# Patient Record
Sex: FEMALE | Race: WHITE
[De-identification: names, ages, dates, MRNs, and addresses within clinical notes are randomized per-mention and may not be internally consistent; named-entity substitution may affect disease eponyms.]

---

## 2018-07-15 ENCOUNTER — HOSPITAL ENCOUNTER (EMERGENCY)
Dept: HOSPITAL 61 - ER | Age: 48
Discharge: HOME | End: 2018-07-15
Payer: SELF-PAY

## 2018-07-15 DIAGNOSIS — R06.02: ICD-10-CM

## 2018-07-15 DIAGNOSIS — R07.89: Primary | ICD-10-CM

## 2018-07-15 DIAGNOSIS — Z90.710: ICD-10-CM

## 2018-07-15 DIAGNOSIS — Z88.5: ICD-10-CM

## 2018-07-15 DIAGNOSIS — F32.9: ICD-10-CM

## 2018-07-15 DIAGNOSIS — Z88.8: ICD-10-CM

## 2018-07-15 DIAGNOSIS — F41.9: ICD-10-CM

## 2018-07-15 DIAGNOSIS — M54.2: ICD-10-CM

## 2018-07-15 LAB
ADD MAN DIFF?: NO
ALBUMIN SERPL-MCNC: 3.9 G/DL (ref 3.4–5)
ALBUMIN/GLOB SERPL: 1 {RATIO} (ref 1–1.7)
ALP SERPL-CCNC: 40 U/L (ref 46–116)
ALT (SGPT): 16 U/L (ref 14–59)
ANION GAP SERPL CALC-SCNC: 11 MMOL/L (ref 6–14)
AST SERPL-CCNC: 16 U/L (ref 15–37)
BASO #: 0.1 X10^3/UL (ref 0–0.2)
BASO %: 1 % (ref 0–3)
BLOOD UREA NITROGEN: 15 MG/DL (ref 7–20)
BUN/CREAT SERPL: 19 (ref 6–20)
CALCIUM: 8.9 MG/DL (ref 8.5–10.1)
CHLORIDE: 103 MMOL/L (ref 98–107)
CK SERPL-CCNC: 84 U/L (ref 26–192)
CKMB INDEX: 1.3 % (ref 0–4)
CKMB MASS: 1.1 NG/ML (ref 0–3.6)
CO2 SERPL-SCNC: 25 MMOL/L (ref 21–32)
CREAT SERPL-MCNC: 0.8 MG/DL (ref 0.6–1)
D-DIMER: < 0.27 UG/MLFEU (ref 0–0.5)
EOS #: 0.2 X10^3/UL (ref 0–0.7)
EOS %: 2 % (ref 0–3)
GFR SERPLBLD BASED ON 1.73 SQ M-ARVRAT: 76.2 ML/MIN
GLOBULIN SER-MCNC: 4 G/DL (ref 2.2–3.8)
GLUCOSE SERPL-MCNC: 135 MG/DL (ref 70–99)
HCG SERPL-ACNC: 9.2 X10^3/UL (ref 4–11)
HEMATOCRIT: 41.7 % (ref 36–47)
HEMOGLOBIN: 13.9 G/DL (ref 12–15.5)
INR: 1 (ref 0.8–1.1)
LYMPH #: 3.5 X10^3/UL (ref 1–4.8)
LYMPH %: 38 % (ref 24–48)
MEAN CORPUSCULAR HEMOGLOBIN: 33 PG (ref 25–35)
MEAN CORPUSCULAR HGB CONC: 33 G/DL (ref 31–37)
MEAN CORPUSCULAR VOLUME: 98 FL (ref 79–100)
MONO #: 0.7 X10^3/UL (ref 0–1.1)
MONO %: 7 % (ref 0–9)
NEUT #: 4.7 X10^3UL (ref 1.8–7.7)
NEUT %: 51 % (ref 31–73)
NT-PRO BNP: 72 PG/ML (ref 0–124)
PLATELET COUNT: 431 X10^3/UL (ref 140–400)
POTASSIUM SERPL-SCNC: 3.7 MMOL/L (ref 3.5–5.1)
PROTHROMBIN TIME PATIENT: 13 SEC (ref 11.7–14)
RED BLOOD COUNT: 4.24 X10^6/UL (ref 3.5–5.4)
RED CELL DISTRIBUTION WIDTH: 13.7 % (ref 11.5–14.5)
SODIUM: 139 MMOL/L (ref 136–145)
THYROID STIM HORMONE (TSH): 1.75 UIU/ML (ref 0.36–3.74)
TOTAL BILIRUBIN: 0.2 MG/DL (ref 0.2–1)
TOTAL PROTEIN: 7.9 G/DL (ref 6.4–8.2)
TROPONINI: < 0.017 NG/ML (ref 0–0.06)
TROPONINI: < 0.017 NG/ML (ref 0–0.06)

## 2018-07-15 PROCEDURE — 84443 ASSAY THYROID STIM HORMONE: CPT

## 2018-07-15 PROCEDURE — 85379 FIBRIN DEGRADATION QUANT: CPT

## 2018-07-15 PROCEDURE — 83880 ASSAY OF NATRIURETIC PEPTIDE: CPT

## 2018-07-15 PROCEDURE — 82553 CREATINE MB FRACTION: CPT

## 2018-07-15 PROCEDURE — 80053 COMPREHEN METABOLIC PANEL: CPT

## 2018-07-15 PROCEDURE — 85025 COMPLETE CBC W/AUTO DIFF WBC: CPT

## 2018-07-15 PROCEDURE — 99285 EMERGENCY DEPT VISIT HI MDM: CPT

## 2018-07-15 PROCEDURE — 36415 COLL VENOUS BLD VENIPUNCTURE: CPT

## 2018-07-15 PROCEDURE — 85610 PROTHROMBIN TIME: CPT

## 2018-07-15 PROCEDURE — 71046 X-RAY EXAM CHEST 2 VIEWS: CPT

## 2018-07-15 PROCEDURE — 93005 ELECTROCARDIOGRAM TRACING: CPT

## 2018-07-15 PROCEDURE — 84484 ASSAY OF TROPONIN QUANT: CPT

## 2018-07-15 RX ADMIN — ASPIRIN 325 MG ORAL TABLET 1 MG: 325 PILL ORAL at 16:05

## 2021-05-07 ENCOUNTER — HOSPITAL ENCOUNTER (OUTPATIENT)
Dept: HOSPITAL 63 - MAMMO | Age: 51
End: 2021-05-07
Attending: NURSE PRACTITIONER
Payer: COMMERCIAL

## 2021-05-07 DIAGNOSIS — Z12.31: Primary | ICD-10-CM

## 2021-05-07 PROCEDURE — 77067 SCR MAMMO BI INCL CAD: CPT

## 2021-05-07 NOTE — RAD
DATE: 5/7/2021



EXAM: DIGITAL SCREEN BILAT W/CAD



HISTORY: Screening



COMPARISON: 1/21/2016



This study was interpreted with the benefit of Computerized Aided Detection

(CAD).





Breast Density: SCATTERED The breast parenchyma shows scattered fibroglandular

densities. Breast parenchyma level B.





FINDINGS: No mass, suspicious calcification, or architectural distortion in

either breast.  





IMPRESSION: No evidence of malignancy.  







BI-RADS CATEGORY: 1 NEGATIVE



RECOMMENDED FOLLOW-UP: 12M 12 MONTH FOLLOW-UP



PQRS compliance statement: Patient information was entered into a reminder

system with a target due date for the next mammogram.



Mammography is a sensitive method for finding small breast cancers, but it

does not detect them all and is not a substitute for careful clinical

examination.  A negative mammogram does not negate a clinically suspicious

finding and should not result in delay in biopsying a clinically suspicious

abnormality.



"Our facility is accredited by the [employer] Mammography

Program."

## 2021-10-19 ENCOUNTER — HOSPITAL ENCOUNTER (EMERGENCY)
Dept: HOSPITAL 63 - ER | Age: 51
Discharge: HOME | End: 2021-10-19
Payer: SELF-PAY

## 2021-10-19 VITALS
HEIGHT: 66 IN | DIASTOLIC BLOOD PRESSURE: 75 MMHG | WEIGHT: 170.64 LBS | BODY MASS INDEX: 27.42 KG/M2 | SYSTOLIC BLOOD PRESSURE: 135 MMHG

## 2021-10-19 DIAGNOSIS — Z88.8: ICD-10-CM

## 2021-10-19 DIAGNOSIS — J18.9: ICD-10-CM

## 2021-10-19 DIAGNOSIS — U07.1: Primary | ICD-10-CM

## 2021-10-19 DIAGNOSIS — Z88.5: ICD-10-CM

## 2021-10-19 PROCEDURE — 71045 X-RAY EXAM CHEST 1 VIEW: CPT

## 2021-10-19 PROCEDURE — U0003 INFECTIOUS AGENT DETECTION BY NUCLEIC ACID (DNA OR RNA); SEVERE ACUTE RESPIRATORY SYNDROME CORONAVIRUS 2 (SARS-COV-2) (CORONAVIRUS DISEASE [COVID-19]), AMPLIFIED PROBE TECHNIQUE, MAKING USE OF HIGH THROUGHPUT TECHNOLOGIES AS DESCRIBED BY CMS-2020-01-R: HCPCS

## 2021-10-19 PROCEDURE — 99284 EMERGENCY DEPT VISIT MOD MDM: CPT

## 2021-10-19 PROCEDURE — C9803 HOPD COVID-19 SPEC COLLECT: HCPCS

## 2021-10-19 NOTE — RAD
Single view of the chest. 10/19/2021 2:15 PM



Indication: Reason: shortness of breath  



Comparison: None



Findings: There is mild right basilar infiltrate or atelectasis. No pneumothorax or pleural effusion 
is seen. Heart size is normal. Bony thorax is intact.



IMPRESSION: Mild right basilar atelectasis or infiltrate. Consider follow-up two-view chest radiograp
h



Electronically signed by: Janak Lucio MD (10/19/2021 2:35 PM) [id number]

## 2021-10-19 NOTE — PHYS DOC
Past History


Past Medical History:  No Pertinent History


Past Surgical History:  No Surgical History





General Adult


EDM:


Chief Complaint:  SHORTNESS OF BREATH





HPI:


HPI:


3-year-old female presents to the emergency department complaining of diarrhea, 

shortness of breath, body aches for the last 4 days along with night sweats at 

home.  She reports that she is unvaccinated for COVID-19 and was recently 

exposed to somebody with Covid.  She is worried that she has Covid and feels 

very anxious regarding the diagnosis.  She admits to some fatigue as well.  The 

patient denies nausea, vomiting, fever, chills, chest pain, abdominal pain, re

cent trauma, or any other complaints.





Review of Systems:


Review of Systems:


ROS is otherwise negative except for what was mentioned in the HPI





Family History:


Family History:


non contributory





Allergies:


Allergies:





Allergies








Coded Allergies Type Severity Reaction Last Updated Verified


 


  codeine Allergy Unknown  10/19/21 Yes


 


  propoxyphene Allergy Unknown  10/19/21 Yes











Physical Exam:


PE:


Constitutional: Moderate distress, anxious, tearful. 


HENT: Atraumatic, bilateral external ears normal, nose normal.


Eyes: PERRLA, EOMI, conjunctiva normal, no discharge.


Neck: Normal range of motion, supple, no stridor.


Cardiovascular: Heart rate regular rhythm. 2+ radial pulses


Lungs & Thorax: No respiratory distress, symmetrical expansion.  Bilateral 

breath sounds clear to auscultation


Abdomen: Soft, no tenderness


Skin: Warm, dry.


Extremities: No tenderness, no cyanosis, ROM intact, no edema.


Neurologic: Alert and oriented X 3, normal motor function, normal sensory 

function, no focal deficits noted. Non ataxic gait. GCS 15.


Psychologic: Very anxious, judgment normal





EKG:


EKG:


[]





Radiology/Procedures:


Radiology/Procedures:


PROCEDURE: CHEST AP ONLY








Single view of the chest. 10/19/2021 2:15 PM





Indication: Reason: shortness of breath  





Comparison: None





Findings: There is mild right basilar infiltrate or atelectasis. No pneumothorax

 or pleural effusion is seen. Heart size is normal. Bony thorax is intact.





IMPRESSION: Mild right basilar atelectasis or infiltrate. Consider follow-up 

two-view chest radiograph





Electronically signed by: Janak Lucio MD (10/19/2021 2:35 PM)





Heart Score:


C/O Chest Pain:  No





Course & Med Decision Making:


Course & Med Decision Making


Covid test was obtained and pending, chest x-ray suggestive of a mild pneumonia 

in the right lower lobe, she was placed on azithromycin for 5 days and is 

otherwise stable for discharge.  She was advised to quarantine until Covid 

results return





Departure


Departure:


Impression:  


   Primary Impression:  


   Pneumonia


Disposition:  01 HOME / SELF CARE / HOMELESS


Condition:  STABLE


Referrals:  


RUDI ESTRADA (PCP)


Patient Instructions:  Pneumonia, Adult, Easy-to-Read





Additional Instructions:  


You were seen in the emergency department for a pneumonia.  You should return to

 the ED if you develop worsening cough, shortness of breath, chest pain, or any 

other new or concerning symptoms.   Your cough may persist for a few weeks but 

your other symptoms should gradually improve.  You should make sure to drink 

plenty of fluids at home.


You have been given a prescription for azithromycin. This medicine is an 

antibiotic for pneumonia. Please take as prescribed for the full course of the 

prescription. Do not stop taking the medicine early if you feel better, as this 

could risk building antibiotic resistance and may put you at risk for a more 

harmful infection later. 





The most common side effect of antibiotics include nausea, vomiting, diarrhea 

and rash. Please come to be evaluated if you develop any symptoms that are 

concerning to you. One major adverse effect of antibiotics is the development of

 a diarrheal illness called c. diff colitis, if you develop an excessive amount 

of diarrhea or are concerned about this please return to the ER or consult a 

physician.


Scripts


Azithromycin (AZITHROMYCIN TABLET) 250 Mg Tablet


1 PKG PO UD for pneumonia for 5 Days, #6 TAB 0 Refills


   2 the first day followed by 1 for days 2-5


   Prov: APPLE CROW DO         10/19/21











APPLE CROW DO             Oct 19, 2021 14:06